# Patient Record
Sex: FEMALE | Race: BLACK OR AFRICAN AMERICAN | Employment: UNEMPLOYED | URBAN - METROPOLITAN AREA
[De-identification: names, ages, dates, MRNs, and addresses within clinical notes are randomized per-mention and may not be internally consistent; named-entity substitution may affect disease eponyms.]

---

## 2021-02-02 ENCOUNTER — HOSPITAL ENCOUNTER (EMERGENCY)
Age: 36
Discharge: HOME | End: 2021-02-02
Attending: EMERGENCY MEDICINE
Payer: COMMERCIAL

## 2021-02-02 VITALS
WEIGHT: 128.31 LBS | HEART RATE: 94 BPM | DIASTOLIC BLOOD PRESSURE: 87 MMHG | RESPIRATION RATE: 20 BRPM | HEIGHT: 67 IN | TEMPERATURE: 98.7 F | SYSTOLIC BLOOD PRESSURE: 126 MMHG | OXYGEN SATURATION: 97 % | BODY MASS INDEX: 20.14 KG/M2

## 2021-02-02 DIAGNOSIS — J02.9 ACUTE PHARYNGITIS, UNSPECIFIED ETIOLOGY: Primary | ICD-10-CM

## 2021-02-02 LAB — S PYO AG THROAT QL: NEGATIVE

## 2021-02-02 PROCEDURE — 6360000002 HC RX W HCPCS: Performed by: EMERGENCY MEDICINE

## 2021-02-02 PROCEDURE — 87081 CULTURE SCREEN ONLY: CPT

## 2021-02-02 PROCEDURE — 99284 EMERGENCY DEPT VISIT MOD MDM: CPT

## 2021-02-02 PROCEDURE — 6370000000 HC RX 637 (ALT 250 FOR IP): Performed by: EMERGENCY MEDICINE

## 2021-02-02 PROCEDURE — 87880 STREP A ASSAY W/OPTIC: CPT

## 2021-02-02 RX ORDER — IBUPROFEN 600 MG/1
600 TABLET ORAL EVERY 6 HOURS PRN
Qty: 40 TABLET | Refills: 0 | Status: SHIPPED | OUTPATIENT
Start: 2021-02-02

## 2021-02-02 RX ORDER — DEXAMETHASONE 4 MG/1
8 TABLET ORAL ONCE
Status: COMPLETED | OUTPATIENT
Start: 2021-02-02 | End: 2021-02-02

## 2021-02-02 RX ADMIN — IBUPROFEN 400 MG: 200 SUSPENSION ORAL at 14:54

## 2021-02-02 RX ADMIN — DEXAMETHASONE 8 MG: 4 TABLET ORAL at 14:53

## 2021-02-02 ASSESSMENT — PAIN DESCRIPTION - PAIN TYPE: TYPE: ACUTE PAIN

## 2021-02-02 ASSESSMENT — PAIN DESCRIPTION - DESCRIPTORS: DESCRIPTORS: ACHING;SORE

## 2021-02-02 ASSESSMENT — PAIN SCALES - GENERAL: PAINLEVEL_OUTOF10: 10

## 2021-02-02 NOTE — ED NOTES
Sore throat at 3. HA at 2. Discharge instructions with pt. Pharyngitis teaching with pt. Explained rx's. Explained importance of having a family doctor for regular medical care. Explained how to get a family doctor. [place] clinic info sheet given.  [facility] clinic list given. [facility] referral line given. MAYDA Allen , phone number listed in case pt needs any further assistance.      Ambrosio Roe RN  02/02/21 [id number]

## 2021-02-02 NOTE — ED PROVIDER NOTES
CHIEF COMPLAINT  Pharyngitis (sick since yesterday. no known sick or COVID exposure. reports sore throat at 10 and HA at 5.   hasn't been checking her temp. no cough/N/V/D.) and Headache      HISTORY OF PRESENT ILLNESS  Milly Goodell is a 30 y.o. female who presents to the ED complaining of sore throat over the past day and a half. Patient states that sore throat is 10 out of 10 pain. States that hurts when she swallows. Nothing makes the pain better. Denies any drooling and states she is able to tolerate secretions but has pain with swallowing. Denies any associated cough. No neck pain or stiffness. States she also has a slight headache. Denies any thunderclap onset with a headache. Not the worst headache of her life. Denies taking her temperature at home. Denies taking any medication for symptoms prior to coming to the emergency room. No nausea or vomiting. No associate abdominal pain. Normal urinary and bowel habits. No other complaints, modifying factors or associated symptoms. Nursing notes reviewed. Past Medical History:   Diagnosis Date    Diabetes mellitus ([facility]     Type 2 diabetes mellitus ([facility]      History reviewed. No pertinent surgical history. History reviewed. No pertinent family history. Social History     Socioeconomic History    Marital status: Single     Spouse name: Not on file    Number of children: Not on file    Years of education: Not on file    Highest education level: Not on file   Occupational History    Not on file   Social Needs    Financial resource strain: Not on file    Food insecurity     Worry: Not on file     Inability: Not on file    Transportation needs     Medical: Not on file     Non-medical: Not on file   Tobacco Use    Smoking status: Current Every Day Smoker     Packs/day: 0.20     Types: Cigarettes    Smokeless tobacco: Never Used   Substance and Sexual Activity    Alcohol use: Yes     Comment: once a week    Drug use:  Yes Types:  Marijuana     Comment: 2/2/21 marijuana    Sexual activity: Not on file   Lifestyle    Physical activity     Days per week: Not on file     Minutes per session: Not on file    Stress: Not on file   Relationships    Social connections     Talks on phone: Not on file     Gets together: Not on file     Attends Taoism service: Not on file     Active member of club or organization: Not on file     Attends meetings of clubs or organizations: Not on file     Relationship status: Not on file    Intimate partner violence     Fear of current or ex partner: Not on file     Emotionally abused: Not on file     Physically abused: Not on file     Forced sexual activity: Not on file   Other Topics Concern    Not on file   Social History Narrative    Not on file     Current Facility-Administered Medications   Medication Dose Route Frequency Provider Last Rate Last Admin    dexamethasone (DECADRON) tablet 8 mg  8 mg Oral Once Ck Richardson MD        ibuprofen (ADVIL;MOTRIN) 100 MG/5ML suspension 400 mg  400 mg Oral Once Ck Richardson MD         Current Outpatient Medications   Medication Sig Dispense Refill    Dyclonine-Glycerin (CEPACOL SORE THROAT SPRAY) 0.1-33 % LIQD 2 sprays in the throat every 3 hours as needed for sore throat 118 mL 0    ibuprofen (ADVIL;MOTRIN) 600 MG tablet Take 1 tablet by mouth every 6 hours as needed for Pain 40 tablet 0     No Known Allergies      REVIEW OF SYSTEMS  10 systems reviewed, pertinent positives per HPI otherwise noted to be negative    PHYSICAL EXAM  /87   Pulse 94   Temp 98.7 °F (37.1 °C) (Oral)   Resp 20   Ht 5' 7\" (1.702 m)   Wt 128 lb 4.9 oz (58.2 kg)   LMP 01/04/2021   SpO2 97%   Breastfeeding No   BMI 20.10 kg/m²      CONSTITUTIONAL: AOx4, cooperative with exam, afebrile   HEAD: normocephalic, atraumatic   EYES: PERRL, EOMI, anicteric sclera   ENT: Moist mucous membranes, uvula midline, TMs normal bilaterally, mild swelling of the posterior pharynx without unilateral swelling, no exudate, erythema present   NECK: Supple, symmetric, trachea midline, no meningismus, no stridor   LUNGS: Bilateral breath sounds, CTAB, no rales/ronchi/wheezes   CARDIOVASCULAR: RRR, normal S1/S2, no m/r/g, 2+ pulses throughout   ABDOMEN: Soft, non-tender, non-distended, +BS   NEUROLOGIC:  MAEx4, GCS 15   MUSCULOSKELETAL: No clubbing, cyanosis or edema   SKIN: No rash, pallor or wounds on exposed surfaces         RADIOLOGY  X-RAYS:  I have reviewed radiologic plain film image(s). ALL OTHER NON-PLAIN FILM IMAGES SUCH AS CT, ULTRASOUND AND MRI HAVE BEEN READ BY THE RADIOLOGIST. No orders to display          EKG INTERPRETATION  None    PROCEDURES    ED COURSE/MDM  Viral pharyngitis, strep pharyngitis, mononucleosis, postnasal drip, URI  Patient seen and evaluated. History and physical as above. Nontoxic, afebrile. Patient with complaints of sore throat. Does have mild tonsillar hypertrophy bilaterally without exudate. No uvula swelling. Tolerating her secretions and no drooling. Speaking in full sentences. No stridor. Strep swab sent negative. Patient updated on results. Oral Decadron given for swelling and pain control. Ibuprofen for pain. Discussed discharge with outpatient follow-up. Discharged with Cepacol and ibuprofen prescriptions. Return instructions provided. All questions answered prior to discharge. I estimate there is LOW risk for a ANAPHYLAXIS, DEEP SPACE INFECTION (e.g., ALIRIOS ANGINA OR RETROPHARYNGEAL ABSCESS), EPIGLOTTITIS, MENINGITIS, or AIRWAY COMPROMISE, thus I consider the discharge disposition reasonable. Also, there is no evidence or peritonitis, sepsis, or toxicity. Deniz Cross and I have discussed the diagnosis and risks, and we agree with discharging home to follow-up with their primary doctor. We also discussed returning to the Emergency Department immediately if new or worsening symptoms occur.  We have discussed the symptoms which are most concerning (e.g., changing or worsening pain, trouble swallowing or breathing, neck stiffness or fever) that necessitate immediate return. Patient was given scripts for the following medications. I counseled patient how to take these medications. New Prescriptions    DYCLONINE-GLYCERIN (CEPACOL SORE THROAT SPRAY) 0.1-33 % LIQD    2 sprays in the throat every 3 hours as needed for sore throat    IBUPROFEN (ADVIL;MOTRIN) 600 MG TABLET    Take 1 tablet by mouth every 6 hours as needed for Pain           CLINICAL IMPRESSION  1. Acute pharyngitis, unspecified etiology        Blood pressure 126/87, pulse 94, temperature 98.7 °F (37.1 °C), temperature source Oral, resp. rate 20, height 5' 7\" (1.702 m), weight 128 lb 4.9 oz (58.2 kg), last menstrual period 01/04/2021, SpO2 97 %, not currently breastfeeding. DISPOSITION  Patient was discharged to home in good condition. [facility]  [phone]  Call today  For a follow up appointment. Disclaimer: All medical record entries made by [address] [place] [address] [address]  [facility].       (Please note that this note was completed with a voice recognition program. Every attempt was made to edit the dictations, but inevitably there remain words that are mis-transcribed.)            Lisa Reynoso MD  02/02/21 1457

## 2021-02-04 LAB — S PYO THROAT QL CULT: NORMAL

## 2021-12-03 ENCOUNTER — HOSPITAL ENCOUNTER (EMERGENCY)
Age: 36
Discharge: HOME | End: 2021-12-03
Attending: EMERGENCY MEDICINE
Payer: COMMERCIAL

## 2021-12-03 ENCOUNTER — APPOINTMENT (OUTPATIENT)
Dept: GENERAL RADIOLOGY | Age: 36
End: 2021-12-03
Payer: COMMERCIAL

## 2021-12-03 VITALS
BODY MASS INDEX: 19.58 KG/M2 | HEART RATE: 110 BPM | TEMPERATURE: 98.5 F | OXYGEN SATURATION: 100 % | WEIGHT: 125 LBS | DIASTOLIC BLOOD PRESSURE: 106 MMHG | SYSTOLIC BLOOD PRESSURE: 144 MMHG | RESPIRATION RATE: 16 BRPM

## 2021-12-03 DIAGNOSIS — S53.401A ELBOW SPRAIN, RIGHT, INITIAL ENCOUNTER: ICD-10-CM

## 2021-12-03 DIAGNOSIS — S63.501A SPRAIN OF RIGHT WRIST, INITIAL ENCOUNTER: Primary | ICD-10-CM

## 2021-12-03 PROCEDURE — 73110 X-RAY EXAM OF WRIST: CPT

## 2021-12-03 PROCEDURE — 99283 EMERGENCY DEPT VISIT LOW MDM: CPT

## 2021-12-03 PROCEDURE — 73080 X-RAY EXAM OF ELBOW: CPT

## 2021-12-03 ASSESSMENT — PAIN DESCRIPTION - PAIN TYPE: TYPE: ACUTE PAIN

## 2021-12-03 ASSESSMENT — PAIN DESCRIPTION - LOCATION: LOCATION: ELBOW

## 2021-12-03 ASSESSMENT — PAIN SCALES - GENERAL: PAINLEVEL_OUTOF10: 10

## 2021-12-03 ASSESSMENT — PAIN DESCRIPTION - ORIENTATION: ORIENTATION: RIGHT

## 2021-12-03 NOTE — ED NOTES
Pt yelling at officers. Shiela MALDONADO standing nearby. Pt is in handcuffs.      Sourav Wall RN  12/03/21 2277

## 2021-12-03 NOTE — ED TRIAGE NOTES
Pt brought in via [address] for right elbow and wrist pain. Pt states pain began when put in handcuffs.

## 2021-12-04 NOTE — ED PROVIDER NOTES
TRIAGE CHIEF COMPLAINT:   Chief Complaint   Patient presents with    Elbow Pain         HPI: Bernabe Kaur is a 29 y.o. female who presents to the Emergency Department with complaint of right arm pain secondary to being handcuffed while being placed under arrest.  Patient is in the custody of 2 police officers. She complains that her arm was twisted and she has pain. She is demanding an x-ray. Denies any other pain. REVIEW OF SYSTEMS:  6 systems reviewed. Pertinent positives per HPI. Otherwise noted to be negative. Nursing notes reviewed and agree with above. Past medical/surgical history reviewed. MEDICATIONS   Discharge Medication List as of 12/3/2021  3:59 PM      CONTINUE these medications which have NOT CHANGED    Details   NONFORMULARY Diabetes pillHistorical Med      Dyclonine-Glycerin (CEPACOL SORE THROAT SPRAY) 0.1-33 % LIQD 2 sprays in the throat every 3 hours as needed for sore throat, Disp-118 mL, R-0Print      ibuprofen (ADVIL;MOTRIN) 600 MG tablet Take 1 tablet by mouth every 6 hours as needed for Pain, Disp-40 tablet, R-0Print               ALLERGIES No Known Allergies      BP (!) 144/106   Pulse 110   Temp 98.5 °F (36.9 °C) (Oral)   Resp 16   Wt 125 lb (56.7 kg)   LMP 10/19/2021 (Approximate)   SpO2 100%   BMI 19.58 kg/m²   General:  Patient is clinically intoxicated, agitated, cursing and verbally abusive. Head:   Normocephalic and atraumatic  Eyes:   Conjunctiva clear, STEVE, EOM's intact. ENT:   Mucous membranes moist  Neck:   Supple. No adenopathy. Lungs/Chest:  No respiratory distress  CVS:   Regular rate and rhythm  Extremities:  Patient is handcuffed with her arms behind her back. Complains of pain with palpation of the right wrist.  No definite tenderness of the right elbow or shoulder. There is no visible swelling bruising or deformity. Distal neurovascular exam is intact. Skin:   No rashes or lesions to exposed skin  Neuro:  Alert and OX3.  Speech clear and appropriate. No extremity weakness. Normal sensation in all extremities. No facial asymmetry. Gait normal.  Psych:   Affect normal. Mood normal        RADIOLOGY  XR WRIST RIGHT (MIN 3 VIEWS)   Final Result      1. No findings for acute bony or soft tissue abnormality. 3 VIEWS OF THE RIGHT WRIST      HISTORY: Pain after arrest      FINDINGS: There is no fracture or dislocation. Radiocarpal and carpocarpal joints are intact. Remaining visualized bones of the hand are intact. No soft tissue abnormality identified. No radiopaque foreign body seen. IMPRESSION:      1. No findings for acute traumatic bony or soft tissue abnormality. XR ELBOW RIGHT (MIN 3 VIEWS)   Final Result      1. No findings for acute bony or soft tissue abnormality. 3 VIEWS OF THE RIGHT WRIST      HISTORY: Pain after arrest      FINDINGS: There is no fracture or dislocation. Radiocarpal and carpocarpal joints are intact. Remaining visualized bones of the hand are intact. No soft tissue abnormality identified. No radiopaque foreign body seen. IMPRESSION:      1. No findings for acute traumatic bony or soft tissue abnormality. LAB      ED COURSE / MDM:        I discussed with Evelio Shin the results of evaluation in the Emergency Department, diagnosis, care and prognosis. The plan is to discharge to home. The patient is in agreement with the plan and questions have been answered. I also discussed with the patient and/or family the reasons which may require a return visit and the importance of follow-up care.        (Please note that portions of this note may have been completed with a voice recognition program.  Efforts were made to edit the dictation but occasionally words are mis-transcribed)        FINAL IMPRESSION:  1 --right wrist sprain  2 --right elbow sprain     Ministerio Mckeon MD  12/04/21 [id number]